# Patient Record
Sex: MALE
[De-identification: names, ages, dates, MRNs, and addresses within clinical notes are randomized per-mention and may not be internally consistent; named-entity substitution may affect disease eponyms.]

---

## 2018-11-30 ENCOUNTER — HOSPITAL ENCOUNTER (OUTPATIENT)
Dept: HOSPITAL 31 - C.SDS | Age: 48
Discharge: HOME | End: 2018-11-30
Attending: UROLOGY
Payer: SELF-PAY

## 2018-11-30 VITALS
SYSTOLIC BLOOD PRESSURE: 113 MMHG | OXYGEN SATURATION: 99 % | TEMPERATURE: 98 F | HEART RATE: 75 BPM | DIASTOLIC BLOOD PRESSURE: 68 MMHG

## 2018-11-30 VITALS — BODY MASS INDEX: 36.9 KG/M2

## 2018-11-30 VITALS — RESPIRATION RATE: 16 BRPM

## 2018-11-30 DIAGNOSIS — N47.1: Primary | ICD-10-CM

## 2018-11-30 DIAGNOSIS — N39.0: ICD-10-CM

## 2018-11-30 PROCEDURE — 54161 CIRCUM 28 DAYS OR OLDER: CPT

## 2018-11-30 PROCEDURE — 88304 TISSUE EXAM BY PATHOLOGIST: CPT

## 2018-11-30 RX ADMIN — BUPIVACAINE HYDROCHLORIDE ONE ML: 2.5 INJECTION, SOLUTION INFILTRATION; PERINEURAL at 14:04

## 2018-11-30 RX ADMIN — BUPIVACAINE HYDROCHLORIDE ONE ML: 2.5 INJECTION, SOLUTION INFILTRATION; PERINEURAL at 14:44

## 2018-11-30 RX ADMIN — BACITRACIN ZINC ONE EA: 500 OINTMENT TOPICAL at 14:44

## 2018-11-30 RX ADMIN — BACITRACIN ZINC ONE EA: 500 OINTMENT TOPICAL at 14:05

## 2018-11-30 NOTE — PCM.SURG1
Surgeon's Initial Post Op Note





- Surgeon's Notes


Surgeon: chula


Assistant: none


Type of Anesthesia: General Endo


Anesthesia Administered By: 


Pre-Operative Diagnosis: phimosis.


Operative Findings: phimosis


Post-Operative Diagnosis: phimosis


Operation Performed: circumcision


Specimen/Specimens Removed: foreskin


Estimated Blood Loss: EBL {In ML}: 5


Blood Products Given: N/A


Drains Used: No Drains


Post-Op Condition: Good


Date of Surgery/Procedure: 11/30/18


Time of Surgery/Procedure: 14:52

## 2019-02-10 ENCOUNTER — HOSPITAL ENCOUNTER (EMERGENCY)
Dept: HOSPITAL 31 - C.ER | Age: 49
Discharge: HOME | End: 2019-02-10
Payer: SELF-PAY

## 2019-02-10 VITALS
RESPIRATION RATE: 20 BRPM | OXYGEN SATURATION: 100 % | DIASTOLIC BLOOD PRESSURE: 79 MMHG | SYSTOLIC BLOOD PRESSURE: 127 MMHG | TEMPERATURE: 98.5 F | HEART RATE: 60 BPM

## 2019-02-10 VITALS — BODY MASS INDEX: 36.9 KG/M2

## 2019-02-10 DIAGNOSIS — R07.9: Primary | ICD-10-CM

## 2019-02-10 LAB
ALBUMIN SERPL-MCNC: 4.7 {NULL, G/DL} (ref 3.5–5)
ALBUMIN/GLOB SERPL: 1.9 {NULL, NULL} (ref 1–2.1)
ALT SERPL-CCNC: 30 {NULL, U/L} (ref 21–72)
AST SERPL-CCNC: 34 {NULL, U/L} (ref 17–59)
BASOPHILS # BLD AUTO: 0.1 {NULL, K/UL} (ref 0–0.2)
BASOPHILS NFR BLD: 0.7 {NULL, %} (ref 0–2)
BUN SERPL-MCNC: 17 {NULL, MG/DL} (ref 9–20)
CALCIUM SERPL-MCNC: 8.9 {NULL, MG/DL} (ref 8.6–10.4)
CK MB SERPL-MCNC: 3.21 {NULL, NG/ML} (ref 0–3.38)
EOSINOPHIL # BLD AUTO: 0.3 {NULL, K/UL} (ref 0–0.7)
EOSINOPHIL NFR BLD: 3.2 {NULL, %} (ref 0–4)
ERYTHROCYTE [DISTWIDTH] IN BLOOD BY AUTOMATED COUNT: 12.7 {NULL, %} (ref 11.5–14.5)
GFR NON-AFRICAN AMERICAN: > 60 {NULL, NULL}
HGB BLD-MCNC: 14.5 {NULL, G/DL} (ref 12–18)
LYMPHOCYTES # BLD AUTO: 3.9 {NULL, K/UL} (ref 1–4.3)
LYMPHOCYTES NFR BLD AUTO: 45.9 {NULL, %} (ref 20–40)
MCH RBC QN AUTO: 29.7 {NULL, PG} (ref 27–31)
MCHC RBC AUTO-ENTMCNC: 34.2 {NULL, G/DL} (ref 33–37)
MCV RBC AUTO: 86.9 {NULL, FL} (ref 80–94)
MONOCYTES # BLD: 0.7 {NULL, K/UL} (ref 0–0.8)
MONOCYTES NFR BLD: 8.6 {NULL, %} (ref 0–10)
NEUTROPHILS # BLD: 3.5 {NULL, K/UL} (ref 1.8–7)
NEUTROPHILS NFR BLD AUTO: 41.6 {NULL, %} (ref 50–75)
NRBC BLD AUTO-RTO: 0.1 {NULL, %} (ref 0–2)
PLATELET # BLD: 197 {NULL, K/UL} (ref 130–400)
PMV BLD AUTO: 8.7 {NULL, FL} (ref 7.2–11.7)
RBC # BLD AUTO: 4.86 {NULL, MIL/UL} (ref 4.4–5.9)
WBC # BLD AUTO: 8.4 {NULL, K/UL} (ref 4.8–10.8)

## 2019-02-10 NOTE — C.PDOC
History Of Present Illness


47 y/o M c no PMHx p/w chest pain x 2 days. State began over 24 hours ago. L 

sided, nonradiating. Denies fever, chills, cough, dyspnea, nausea, vomiting, 

diaphoresis, trauma.


Time Seen by Provider: 02/10/19 18:32


Chief Complaint (Nursing): Chest Pain





Past Medical History


Vital Signs: 





                                Last Vital Signs











Temp  98.5 F   02/10/19 18:09


 


Pulse  60   02/10/19 18:09


 


Resp  20   02/10/19 18:09


 


BP  127/79   02/10/19 18:09


 


Pulse Ox  100   02/10/19 18:09














- Medical History


PMH: Anxiety


   Denies: Chronic Kidney Disease


Family History: States: No Known Family Hx





- Social History


Hx Alcohol Use: No


Hx Substance Use: No





Review Of Systems


Except As Marked, All Systems Reviewed And Found Negative.


Constitutional: Negative for: Fever


Respiratory: Negative for: Shortness of Breath





Physical Exam





- Physical Exam


Additional Physical Exam Comments: 


Constitutional: No acute distress.


Head: Normocephalic. Atraumatic.


Eyes: PERRL.


ENT: Moist mucous membranes.


Neck: Supple.


Cardiovascular: Regular rate. Radial pulse 2+ bilaterally.


Chest: No tenderness.


Respiratory: Clear to auscultation bilaterally.


GI: Soft. Nontender. Nondistended.


Back: No CVA tenderness.


Musculoskeletal: No tenderness or swelling of extremities.


Skin: No rash.


Neurologic: Alert, no focal deficit.














ED Course And Treatment





- Laboratory Results


Result Diagrams: 


                                 02/10/19 19:06





                                 02/10/19 19:06


O2 Sat by Pulse Oximetry: 100





Medical Decision Making


Medical Decision Making: 


EKG Sinus rhythm, 60 bpm, no ST/T wave changes. 





CXR no acute disease.





Discharged home, f/u primary care, return to ED for worsening pain, dyspnea, or 

any other problem.





Disposition





- Disposition


Disposition: HOME/ ROUTINE


Disposition Time: 20:24


Condition: STABLE


Instructions:  Chest Pain, Chest Pain That Is Not Caused by the Heart (DC)


Forms:  [facility] (English)





- Clinical Impression


Clinical Impression: 


 Chest pain

## 2019-02-11 NOTE — RAD
HISTORY:

 chest pain 



COMPARISON:

None available. 



TECHNIQUE:

Chest PA and lateral



FINDINGS:





LUNGS:

No focal consolidation.



Please note that chest x-ray has limited sensitivity for the 

detection of pulmonary masses.



PLEURA:

No significant pleural effusion identified. No definite pneumothorax .



CARDIOVASCULAR:

Heart size appears within normal limits.  No atherosclerotic 

calcification present.



OSSEOUS STRUCTURES:

Degenerative changes.  Mild compression fracture deformities of the 

lower thoracic spine, age indeterminate. 



VISUALIZED UPPER ABDOMEN:

Unremarkable.



OTHER FINDINGS:

None.



IMPRESSION:

No focal consolidation. 



Degenerative changes.  Mild compression fracture deformities of the 

lower thoracic spine, age indeterminate.

## 2019-02-11 NOTE — CARD
--------------- APPROVED REPORT --------------





Date of service: 02/10/2019



EKG Measurement

Heart [id number]

[place] [id number]

[id number]

[id number]

[id number]



<Conclusion>

Sinus bradycardia,u waves

Otherwise normal ECG

## 2019-04-30 ENCOUNTER — HOSPITAL ENCOUNTER (EMERGENCY)
Dept: HOSPITAL 31 - C.ER | Age: 49
Discharge: HOME | End: 2019-04-30
Payer: SELF-PAY

## 2019-04-30 VITALS
TEMPERATURE: 98 F | DIASTOLIC BLOOD PRESSURE: 80 MMHG | HEART RATE: 74 BPM | SYSTOLIC BLOOD PRESSURE: 133 MMHG | OXYGEN SATURATION: 100 %

## 2019-04-30 VITALS — BODY MASS INDEX: 36.9 KG/M2

## 2019-04-30 VITALS — RESPIRATION RATE: 20 BRPM

## 2019-04-30 DIAGNOSIS — R00.2: Primary | ICD-10-CM

## 2019-04-30 DIAGNOSIS — I10: ICD-10-CM

## 2019-04-30 LAB
ALBUMIN SERPL-MCNC: 4.1 G/DL (ref 3.5–5)
ALBUMIN/GLOB SERPL: 1.7 {RATIO} (ref 1–2.1)
ALT SERPL-CCNC: 33 U/L (ref 21–72)
AST SERPL-CCNC: 25 U/L (ref 17–59)
BASOPHILS # BLD AUTO: 0 K/UL (ref 0–0.2)
BASOPHILS NFR BLD: 0.6 % (ref 0–2)
BNP SERPL-MCNC: 19.3 PG/ML (ref 0–450)
BUN SERPL-MCNC: 23 MG/DL (ref 9–20)
CALCIUM SERPL-MCNC: 8.9 MG/DL (ref 8.6–10.4)
EOSINOPHIL # BLD AUTO: 0.3 K/UL (ref 0–0.7)
EOSINOPHIL NFR BLD: 3.7 % (ref 0–4)
ERYTHROCYTE [DISTWIDTH] IN BLOOD BY AUTOMATED COUNT: 12.5 % (ref 11.5–14.5)
GFR NON-AFRICAN AMERICAN: > 60
HGB BLD-MCNC: 13.4 G/DL (ref 12–18)
LYMPHOCYTES # BLD AUTO: 3.7 K/UL (ref 1–4.3)
LYMPHOCYTES NFR BLD AUTO: 46.4 % (ref 20–40)
MCH RBC QN AUTO: 29.6 PG (ref 27–31)
MCHC RBC AUTO-ENTMCNC: 34.5 G/DL (ref 33–37)
MCV RBC AUTO: 85.8 FL (ref 80–94)
MONOCYTES # BLD: 0.7 K/UL (ref 0–0.8)
MONOCYTES NFR BLD: 9.3 % (ref 0–10)
NEUTROPHILS # BLD: 3.2 K/UL (ref 1.8–7)
NEUTROPHILS NFR BLD AUTO: 40 % (ref 50–75)
NRBC BLD AUTO-RTO: 0 % (ref 0–2)
PLATELET # BLD: 190 K/UL (ref 130–400)
PMV BLD AUTO: 7.7 FL (ref 7.2–11.7)
RBC # BLD AUTO: 4.54 MIL/UL (ref 4.4–5.9)
WBC # BLD AUTO: 8 K/UL (ref 4.8–10.8)

## 2019-04-30 NOTE — C.PDOC
History Of Present Illness


49 year old male presents to the ED c/o intermittent palpitations for the past 

few days. Patient denies fever, chills, headache, visual changes, SOB, CP, rash,

weakness, numbness.


Chief Complaint (Nursing): Chest Pain


History Per: Patient


History/Exam Limitations: no limitations


Onset/Duration Of Symptoms: Days, Intermittent Episodes


Current Symptoms Are (Timing): Still Present


Recent travel outside of the [place]: No


Additional History Per: Patient





Past Medical History


Reviewed: Historical Data, Nursing Documentation, Vital Signs


Vital Signs: 





                                Last Vital Signs











Temp  98.0 F   04/30/19 21:10


 


Pulse  74   04/30/19 21:10


 


Resp  18   04/30/19 21:10


 


BP  133/80   04/30/19 21:10


 


Pulse Ox  100   04/30/19 21:10











Primary Care Provider: Jasmyne Jean





- Medical History


PMH: Anxiety, HTN


   Denies: Chronic Kidney Disease


Surgical History: No Surg Hx


Family History: States: Unknown Family Hx





- Social History


Hx Alcohol Use: Yes


Hx Substance Use: No





- Immunization History


Hx Tetanus Toxoid Vaccination: No


Hx Influenza Vaccination: Yes


Hx Pneumococcal Vaccination: No





Review Of Systems


Constitutional: Negative for: Fever, Chills


Eyes: Negative for: Vision Change


Cardiovascular: Positive for: Palpitations.  Negative for: Chest Pain


Respiratory: Negative for: Cough, Shortness of Breath


Gastrointestinal: Negative for: Nausea, Vomiting, Abdominal Pain


Skin: Negative for: Rash


Neurological: Negative for: Weakness, Numbness, Headache, Dizziness





Physical Exam





- Physical Exam


Appears: Non-toxic, No Acute Distress


Skin: Normal Color, Warm, Dry


Head: Atraumatic, Normacephalic


Eye(s): bilateral: Normal Inspection, PERRL, EOMI


Neck: Normal ROM, Supple


Chest: Symmetrical


Cardiovascular: Rhythm Regular


Respiratory: Normal Breath Sounds, No Rales, No Rhonchi, No Wheezing


Gastrointestinal/Abdominal: Soft, No Tenderness, No Guarding, No Rebound


Extremity: Normal ROM, No Tenderness, No Swelling


Neurological/Psych: Oriented x3, Normal Speech, Normal Cognition


Gait: Steady





ED Course And Treatment





- Laboratory Results


Result Diagrams: 


                                 04/30/19 22:01





                                 04/30/19 22:01


O2 Sat by Pulse Oximetry: 100 (ON RA)


Pulse Ox Interpretation: Normal





Medical Decision Making


Medical Decision Making: 


Plan:


* EKG


* Labs


* CXR








Disposition


Counseled Patient/Family Regarding: Diagnosis





- Disposition


Referrals: 


[facility] [Outside]


Disposition: HOME/ ROUTINE


Disposition Time: 22:51


Condition: STABLE


Instructions:  Palpitations (DC)


Forms:  [facility] (English), Gen Discharge Inst Burmese


Print Language: Armenian





- POA


Present On Arrival: None





- Clinical Impression


Clinical Impression: 


 Intermittent palpitations








- Scribe Statement


The provider has reviewed the documentation as recorded by the Scribe


Chidi Lobo





All medical record entries made by the Scribe were at my direction and 

personally dictated by me. I have reviewed the chart and agree that the record 

accurately reflects my personal performance of the history, physical exam, me

dical decision making, and the department course for this patient. I have also 

personally directed, reviewed, and agree with the discharge instructions and 

disposition.

## 2019-05-01 NOTE — RAD
Date of service: 



04/30/2019



HISTORY:

 chest pain 



COMPARISON:

Chest radiographs 02/10/2019.



TECHNIQUE:

Chest PA and lateral views



FINDINGS:



LUNGS:

No active pulmonary disease.



PLEURA:

No significant pleural effusion identified. No pneumothorax apparent.



CARDIOVASCULAR:

No aortic atherosclerotic calcification present.



Normal cardiac size. No pulmonary vascular congestion. 



OSSEOUS STRUCTURES:

No significant abnormalities.



VISUALIZED UPPER ABDOMEN:

Normal.



OTHER FINDINGS:

None.



IMPRESSION:

No interval acute cardiopulmonary disease appreciated.

## 2019-05-01 NOTE — CARD
--------------- APPROVED REPORT --------------





Date of service: 04/30/2019



EKG Measurement

Heart [id number]

[place] [id number]

[id number]

[id number]

[id number]



<Conclusion>

Sinus rhythm with 1st degree AV block

Nonspecific T wave abnormality

Abnormal ECG

## 2021-07-08 ENCOUNTER — EMERGENCY (EMERGENCY)
Facility: HOSPITAL | Age: 51
LOS: 1 days | End: 2021-07-08
Admitting: EMERGENCY MEDICINE
Payer: COMMERCIAL

## 2021-07-08 PROCEDURE — 71045 X-RAY EXAM CHEST 1 VIEW: CPT | Mod: 26

## 2021-07-08 PROCEDURE — 99284 EMERGENCY DEPT VISIT MOD MDM: CPT

## 2022-06-09 ENCOUNTER — EMERGENCY (EMERGENCY)
Facility: HOSPITAL | Age: 52
LOS: 1 days | Discharge: HOME | End: 2022-06-09
Attending: STUDENT IN AN ORGANIZED HEALTH CARE EDUCATION/TRAINING PROGRAM | Admitting: STUDENT IN AN ORGANIZED HEALTH CARE EDUCATION/TRAINING PROGRAM
Payer: COMMERCIAL

## 2022-06-09 VITALS
TEMPERATURE: 98 F | HEART RATE: 70 BPM | SYSTOLIC BLOOD PRESSURE: 130 MMHG | DIASTOLIC BLOOD PRESSURE: 88 MMHG | RESPIRATION RATE: 18 BRPM | OXYGEN SATURATION: 98 %

## 2022-06-09 VITALS
DIASTOLIC BLOOD PRESSURE: 81 MMHG | TEMPERATURE: 97 F | OXYGEN SATURATION: 100 % | SYSTOLIC BLOOD PRESSURE: 151 MMHG | HEIGHT: 69 IN | RESPIRATION RATE: 18 BRPM | WEIGHT: 214.07 LBS | HEART RATE: 53 BPM

## 2022-06-09 DIAGNOSIS — R42 DIZZINESS AND GIDDINESS: ICD-10-CM

## 2022-06-09 DIAGNOSIS — I10 ESSENTIAL (PRIMARY) HYPERTENSION: ICD-10-CM

## 2022-06-09 DIAGNOSIS — R00.1 BRADYCARDIA, UNSPECIFIED: ICD-10-CM

## 2022-06-09 DIAGNOSIS — E78.5 HYPERLIPIDEMIA, UNSPECIFIED: ICD-10-CM

## 2022-06-09 DIAGNOSIS — R11.0 NAUSEA: ICD-10-CM

## 2022-06-09 DIAGNOSIS — Z20.822 CONTACT WITH AND (SUSPECTED) EXPOSURE TO COVID-19: ICD-10-CM

## 2022-06-09 LAB
ALBUMIN SERPL ELPH-MCNC: 4.8 G/DL — SIGNIFICANT CHANGE UP (ref 3.3–5)
ALP SERPL-CCNC: 59 U/L — SIGNIFICANT CHANGE UP (ref 40–120)
ALT FLD-CCNC: 25 U/L — SIGNIFICANT CHANGE UP (ref 10–45)
ANION GAP SERPL CALC-SCNC: 9 MMOL/L — SIGNIFICANT CHANGE UP (ref 5–17)
APTT BLD: 26.9 SEC — LOW (ref 27.5–35.5)
AST SERPL-CCNC: 21 U/L — SIGNIFICANT CHANGE UP (ref 10–40)
BASOPHILS # BLD AUTO: 0.06 K/UL — SIGNIFICANT CHANGE UP (ref 0–0.2)
BASOPHILS NFR BLD AUTO: 0.7 % — SIGNIFICANT CHANGE UP (ref 0–2)
BILIRUB SERPL-MCNC: 0.4 MG/DL — SIGNIFICANT CHANGE UP (ref 0.2–1.2)
BUN SERPL-MCNC: 17 MG/DL — SIGNIFICANT CHANGE UP (ref 7–23)
CALCIUM SERPL-MCNC: 9.2 MG/DL — SIGNIFICANT CHANGE UP (ref 8.4–10.5)
CHLORIDE SERPL-SCNC: 105 MMOL/L — SIGNIFICANT CHANGE UP (ref 96–108)
CK MB CFR SERPL CALC: 3.7 NG/ML — SIGNIFICANT CHANGE UP (ref 0–6.7)
CK SERPL-CCNC: 225 U/L — HIGH (ref 30–200)
CO2 SERPL-SCNC: 26 MMOL/L — SIGNIFICANT CHANGE UP (ref 22–31)
CREAT SERPL-MCNC: 0.95 MG/DL — SIGNIFICANT CHANGE UP (ref 0.5–1.3)
D DIMER BLD IA.RAPID-MCNC: <150 NG/ML DDU — SIGNIFICANT CHANGE UP
EGFR: 97 ML/MIN/1.73M2 — SIGNIFICANT CHANGE UP
EOSINOPHIL # BLD AUTO: 0.16 K/UL — SIGNIFICANT CHANGE UP (ref 0–0.5)
EOSINOPHIL NFR BLD AUTO: 1.9 % — SIGNIFICANT CHANGE UP (ref 0–6)
GLUCOSE SERPL-MCNC: 119 MG/DL — HIGH (ref 70–99)
HCT VFR BLD CALC: 41.5 % — SIGNIFICANT CHANGE UP (ref 39–50)
HGB BLD-MCNC: 14.3 G/DL — SIGNIFICANT CHANGE UP (ref 13–17)
IMM GRANULOCYTES NFR BLD AUTO: 0.4 % — SIGNIFICANT CHANGE UP (ref 0–1.5)
INR BLD: 0.97 — SIGNIFICANT CHANGE UP (ref 0.88–1.16)
LYMPHOCYTES # BLD AUTO: 2.51 K/UL — SIGNIFICANT CHANGE UP (ref 1–3.3)
LYMPHOCYTES # BLD AUTO: 29.3 % — SIGNIFICANT CHANGE UP (ref 13–44)
MCHC RBC-ENTMCNC: 30.2 PG — SIGNIFICANT CHANGE UP (ref 27–34)
MCHC RBC-ENTMCNC: 34.5 GM/DL — SIGNIFICANT CHANGE UP (ref 32–36)
MCV RBC AUTO: 87.7 FL — SIGNIFICANT CHANGE UP (ref 80–100)
MONOCYTES # BLD AUTO: 0.63 K/UL — SIGNIFICANT CHANGE UP (ref 0–0.9)
MONOCYTES NFR BLD AUTO: 7.4 % — SIGNIFICANT CHANGE UP (ref 2–14)
NEUTROPHILS # BLD AUTO: 5.18 K/UL — SIGNIFICANT CHANGE UP (ref 1.8–7.4)
NEUTROPHILS NFR BLD AUTO: 60.3 % — SIGNIFICANT CHANGE UP (ref 43–77)
NRBC # BLD: 0 /100 WBCS — SIGNIFICANT CHANGE UP (ref 0–0)
PLATELET # BLD AUTO: 165 K/UL — SIGNIFICANT CHANGE UP (ref 150–400)
POTASSIUM SERPL-MCNC: 4.3 MMOL/L — SIGNIFICANT CHANGE UP (ref 3.5–5.3)
POTASSIUM SERPL-SCNC: 4.3 MMOL/L — SIGNIFICANT CHANGE UP (ref 3.5–5.3)
PROT SERPL-MCNC: 7.1 G/DL — SIGNIFICANT CHANGE UP (ref 6–8.3)
PROTHROM AB SERPL-ACNC: 11.5 SEC — SIGNIFICANT CHANGE UP (ref 10.5–13.4)
RBC # BLD: 4.73 M/UL — SIGNIFICANT CHANGE UP (ref 4.2–5.8)
RBC # FLD: 11.9 % — SIGNIFICANT CHANGE UP (ref 10.3–14.5)
SARS-COV-2 RNA SPEC QL NAA+PROBE: NEGATIVE — SIGNIFICANT CHANGE UP
SODIUM SERPL-SCNC: 140 MMOL/L — SIGNIFICANT CHANGE UP (ref 135–145)
TROPONIN T SERPL-MCNC: 0.01 NG/ML — SIGNIFICANT CHANGE UP (ref 0–0.01)
WBC # BLD: 8.57 K/UL — SIGNIFICANT CHANGE UP (ref 3.8–10.5)
WBC # FLD AUTO: 8.57 K/UL — SIGNIFICANT CHANGE UP (ref 3.8–10.5)

## 2022-06-09 PROCEDURE — 82962 GLUCOSE BLOOD TEST: CPT

## 2022-06-09 PROCEDURE — 85025 COMPLETE CBC W/AUTO DIFF WBC: CPT

## 2022-06-09 PROCEDURE — 93005 ELECTROCARDIOGRAM TRACING: CPT

## 2022-06-09 PROCEDURE — 84484 ASSAY OF TROPONIN QUANT: CPT

## 2022-06-09 PROCEDURE — 85379 FIBRIN DEGRADATION QUANT: CPT

## 2022-06-09 PROCEDURE — 70496 CT ANGIOGRAPHY HEAD: CPT | Mod: 26,MA

## 2022-06-09 PROCEDURE — 87635 SARS-COV-2 COVID-19 AMP PRB: CPT

## 2022-06-09 PROCEDURE — 99285 EMERGENCY DEPT VISIT HI MDM: CPT

## 2022-06-09 PROCEDURE — 70450 CT HEAD/BRAIN W/O DYE: CPT | Mod: 26,MA,59

## 2022-06-09 PROCEDURE — 99285 EMERGENCY DEPT VISIT HI MDM: CPT | Mod: 25

## 2022-06-09 PROCEDURE — 96374 THER/PROPH/DIAG INJ IV PUSH: CPT | Mod: XU

## 2022-06-09 PROCEDURE — 70498 CT ANGIOGRAPHY NECK: CPT | Mod: MA

## 2022-06-09 PROCEDURE — 70496 CT ANGIOGRAPHY HEAD: CPT | Mod: MA

## 2022-06-09 PROCEDURE — 0042T: CPT

## 2022-06-09 PROCEDURE — 85730 THROMBOPLASTIN TIME PARTIAL: CPT

## 2022-06-09 PROCEDURE — 70450 CT HEAD/BRAIN W/O DYE: CPT | Mod: MA

## 2022-06-09 PROCEDURE — 82553 CREATINE MB FRACTION: CPT

## 2022-06-09 PROCEDURE — 85610 PROTHROMBIN TIME: CPT

## 2022-06-09 PROCEDURE — 70498 CT ANGIOGRAPHY NECK: CPT | Mod: 26,MA

## 2022-06-09 PROCEDURE — 82550 ASSAY OF CK (CPK): CPT

## 2022-06-09 PROCEDURE — 36415 COLL VENOUS BLD VENIPUNCTURE: CPT

## 2022-06-09 PROCEDURE — 80053 COMPREHEN METABOLIC PANEL: CPT

## 2022-06-09 RX ORDER — MECLIZINE HCL 12.5 MG
50 TABLET ORAL ONCE
Refills: 0 | Status: COMPLETED | OUTPATIENT
Start: 2022-06-09 | End: 2022-06-09

## 2022-06-09 RX ORDER — SODIUM CHLORIDE 9 MG/ML
1000 INJECTION, SOLUTION INTRAVENOUS ONCE
Refills: 0 | Status: COMPLETED | OUTPATIENT
Start: 2022-06-09 | End: 2022-06-09

## 2022-06-09 RX ORDER — ONDANSETRON 8 MG/1
4 TABLET, FILM COATED ORAL ONCE
Refills: 0 | Status: COMPLETED | OUTPATIENT
Start: 2022-06-09 | End: 2022-06-09

## 2022-06-09 RX ORDER — MECLIZINE HCL 12.5 MG
1 TABLET ORAL
Qty: 15 | Refills: 0
Start: 2022-06-09

## 2022-06-09 RX ADMIN — Medication 50 MILLIGRAM(S): at 13:03

## 2022-06-09 RX ADMIN — ONDANSETRON 4 MILLIGRAM(S): 8 TABLET, FILM COATED ORAL at 12:40

## 2022-06-09 RX ADMIN — SODIUM CHLORIDE 1000 MILLILITER(S): 9 INJECTION, SOLUTION INTRAVENOUS at 13:30

## 2022-06-09 NOTE — ED ADULT NURSE NOTE - NSSEPSISSUSPECTED_ED_A_ED
Informed Patient  that [facility] received prescription for Skyrizi and benefits investigation is required.  [facility] will be back in touch once insurance determination is received.     06-Oct-2019 20:51 No

## 2022-06-09 NOTE — ED ADULT NURSE NOTE - OBJECTIVE STATEMENT
Pt presents to ED c/o dizziness, nausea, vomiting, chest tightness since 9am. Reports was having back pain and took pain medication from a previous surgery that "might have been ". Pt reports dizziness and nausea present on arrival. Upgraded to Dr. Rashid- stroke code called. portable ccm initiated, brought to CT scan. NO facial droop, no slurred speech, no weakness. Ambulatory w steady gait, but "feels off". A&Ox4, speaking in clear full sentences.  #[id number] used. Pt presents to ED c/o dizziness, nausea, vomiting, chest tightness since 9am. Reports was having back pain and took pain medication from a previous surgery that "might have been ". Pt reports dizziness and nausea present on arrival. Upgraded to Dr. Rashid- stroke code called. portable ccm initiated, brought to CT scan. NO facial droop, no slurred speech, no weakness. Ambulatory w steady gait, but "feels off". A&Ox4, speaking in clear full sentences.

## 2022-06-09 NOTE — ED PROVIDER NOTE - NSFOLLOWUPINSTRUCTIONS_ED_ALL_ED_FT
EnglishSpanish                                                                                                                           Vértigo posicional taylor    Benign Positional Vertigo      El vértigo es la sensación de que usted o todo lo que lo rodea se mueve cuando en realidad eso no sucede. El vértigo posicional taylor es el tipo de vértigo más común. Generalmente se trata de laila enfermedad no dañina (benigna). Esta afección es posicional. [facility] significa que los síntomas son desencadenados por ciertos movimientos y posiciones.    Esta afección puede ser peligrosa si ocurre mientras está haciendo algo que podría suponer un riesgo para usted o para los demás. Incluye actividades parish conducir u operar maquinaria.      ¿Cuáles son las causas?    El oído interno tiene ram llenos de líquido que ayudan a que el cerebro perciba el movimiento y el equilibrio. Cuando el líquido se mueve, el cerebro recibe mensajes sobre la posición del cuerpo.    En el vértigo posicional taylor, los antonette de calcio que están en el oído interno se desprenden y alteran la chalo del oído interno. [facility] hace que el cerebro reciba mensajes confusos sobre la posición del cuerpo.      ¿Qué incrementa el riesgo?    Es más probable que sufra esta afección si:  •Es robin.      •Es mayor de 50 años.      •Ha sufrido laila lesión en la amy recientemente.      •Tiene laila enfermedad en el oído interno.        ¿Cuáles son los signos o síntomas?    Generalmente, los síntomas de rosalba trastorno se presentan al  la amy o los ojos en diferentes direcciones. Los síntomas pueden aparecer repentinamente y suelen durar menos de un minuto. Incluyen los siguientes:  •Pérdida del equilibrio y caídas.      •Sensación de estar dando vueltas o moviéndose.      •Sensación de que el entorno está dando vueltas o moviéndose.      •Náuseas y vómitos.      •Visión borrosa.      •Mareos.      •Movimientos oculares involuntarios (nistagmo).      Los síntomas pueden ser leves y solo causar problemas menores, o pueden ser graves e interferir en la shonna cotidiana. Los episodios de vértigo posicional taylor pueden repetirse (volver a aparecer) con el transcurso del tiempo. Los síntomas también pueden mejorar con el tiempo.      ¿Cómo se diagnostica?    Esta afección se puede diagnosticar en función de lo siguiente:  •Rachana antecedentes médicos.      •Un examen físico de la amy, el santana y los oídos.      •Pruebas de posición para detectar o estimular el vértigo. Es posible que le pidan que gire la amy y cambie de posición, parish pasar de estar sentado a acostarse. El médico estará pendiente por si aparecen síntomas de vértigo.      Juan vez lo deriven a un médico especialista en problemas de la garganta, la nariz y el oído (otorrinolaringólogo), o a dillon que se especializa en trastornos del sistema nervioso (neurólogo).      ¿Cómo se trata?  Medical person standing behind sitting patient using both hands to move patient's head to another position.   Esta afección se puede tratar en laila sesión en la cual el médico le pondrá la amy en posiciones específicas para ayudar a que los antonette desplazados del oído interno se muevan. El tratamiento de esta afección puede llevar varias sesiones. Cuando los casos son graves, juan vez haya que realizar laila cirugía, bright esto no es frecuente.    En algunos casos, el vértigo posicional taylor se resuelve por sí solo en el término de 2 o 4 semanas.      Siga estas instrucciones en vicente casa:    Seguridad     •Muévase lentamente. Evite algunas posiciones o determinados movimientos repentinos de la amy y el cuerpo parish se lo haya indicado el médico.      •Evite conducir y operar maquinaria hasta que el médico le indique que es seguro hacerlo.      •No shawn ninguna tarea que podría ser peligrosa para usted o para otras personas en susan de vértigo.      •Si tiene dificultad para caminar o mantener el equilibrio, use un bastón para mantener la estabilidad. Si se siente mareado o inestable, siéntese de inmediato.      •Retome rachana actividades normales según lo indicado por el médico. Pregúntele al médico qué actividades son seguras para usted.      Instrucciones generales     •Use los medicamentos de venta viola y los recetados solamente parish se lo haya indicado el médico.      •Naya suficiente líquido parish para mantener la orina de color amarillo pálido.      •Concurra a todas las visitas de seguimiento. [facility] es importante.        Comuníquese con un médico si:    •Tiene fiebre.      •Vicente afección empeora o presenta síntomas nuevos.      •Rachana familiares o amigos advierten cambios en vicente comportamiento.      •Tiene náuseas o vómitos que empeoran.      •Tiene adormecimiento o sensación de pinchazos y hormigueo.        Busque ayuda de inmediato si:    •Tiene dificultad para hablar o para moverse.      •Siempre está mareado o se desmaya.      •Presenta kaylie de amy intensos.      •Tiene debilidad en los brazos o las piernas.      •Presenta cambios en la audición o la visión.      •Presenta rigidez en el santana.      •Presenta sensibilidad a la randy.      Estos síntomas pueden representar un problema grave que constituye laila emergencia. No espere a flaquita si los síntomas desaparecen. Solicite atención médica de inmediato. Comuníquese con el servicio de emergencias de vicente localidad (911 en los [place]). No conduzca por rachana propios medios hasta el hospital.       Resumen    •El vértigo es la sensación de que usted o todo lo que lo rodea se mueve cuando en realidad eso no sucede. El vértigo posicional taylor es el tipo de vértigo más común.      •La causa de esta afección es el desplazamiento de los antonette de calcio del oído interno. [facility] produce laila alteración en laila chalo del oído interno que ayuda al cerebro a percibir el movimiento y el equilibrio.      •Los síntomas incluyen pérdida del equilibrio y caídas, sensación de que usted o vicente entorno se mueve, náuseas y vómitos, y visión borrosa.      •Esta afección se puede diagnosticar en función de los síntomas, un examen físico y pruebas de posicionamiento.      •Siga las instrucciones de seguridad que le haya dado el médico y vaya a todas las visitas de seguimiento. [facility] es importante.      Esta información no tiene parish fin reemplazar el consejo del médico. Asegúrese de hacerle al médico cualquier pregunta que tenga.      Document Revised: 12/13/2021 Document Reviewed: 12/13/2021    Elsevier Patient Education © 2022 Elsevier Inc.

## 2022-06-09 NOTE — CONSULT NOTE ADULT - NS PANP COMMENT GEN_ALL_CORE FT
The patient is a 52-year-old Burundian speaking gentleman with a history of hypertension and hyperlipidemia who presented to the emergency department with vertigo associated with nausea, vomited x1.  NIHSS was 0.  There was no significant gait instability.  Exam was consistent with peripheral vertigo.  Head CT was unremarkable.  Recommend treatment for peripheral vertigo.  Dispo per ED.

## 2022-06-09 NOTE — CONSULT NOTE ADULT - SUBJECTIVE AND OBJECTIVE BOX
**STROKE CODE CONSULT NOTE**    Last known well time/Time of onset of symptoms: 9am on 6/9    HPI: 52y Male with PMHx of HTN, HLD presented to ED today for dizziness. Stroke code called. Patient is primarily Mongolian speaking.  #[id number]. Explains he was driving and at 9am started to experience dizziness and lightheadedness. Had one episode of nausea and vomiting. Had another episode of vomiting after CTA. Denied the room spinning. Denies being on anticoagulants. Denies focal numbness, weakness, vision changes, speech changes, or gait abnormalities.     BP in /86. . CTH unremarkable.     T(C): 36.3 (06-09-22 @ 12:00), Max: 36.3 (06-09-22 @ 12:00)  HR: 66 (06-09-22 @ 14:07) (53 - 81)  BP: 158/79 (06-09-22 @ 14:07) (126/72 - 176/86)  RR: 18 (06-09-22 @ 14:07) (18 - 18)  SpO2: 98% (06-09-22 @ 14:07) (98% - 100%)    PAST MEDICAL & SURGICAL HISTORY:      FAMILY HISTORY:      SOCIAL HISTORY:   Smoking status: denies  Drinking: occasional  Drug Use: denies    ROS:   see HPI      MEDICATIONS  (STANDING):    MEDICATIONS  (PRN):    Allergies    No Known Allergies    Intolerances      Vital Signs Last 24 Hrs  T(C): 36.3 (09 Jun 2022 12:00), Max: 36.3 (09 Jun 2022 12:00)  T(F): 97.3 (09 Jun 2022 12:00), Max: 97.3 (09 Jun 2022 12:00)  HR: 66 (09 Jun 2022 14:07) (53 - 81)  BP: 158/79 (09 Jun 2022 14:07) (126/72 - 176/86)  BP(mean): --  RR: 18 (09 Jun 2022 14:07) (18 - 18)  SpO2: 98% (09 Jun 2022 14:07) (98% - 100%)    Physical exam:  Neurologic:  -Mental status: Awake, alert, oriented to person, place, and time. Speech is fluent with intact naming, repetition, and comprehension, no dysarthria. Recent and remote memory intact. Follows commands. Attention/concentration intact.   -Cranial nerves:   II: Visual fields are full to confrontation.  III, IV, VI: Horizontal nystagmus on initial exam which improved with re-examination about an hour ago. Pupils equally round and reactive to light  V:  Facial sensation V1-V3 equal and intact   VII: Face is symmetric with normal eye closure and smile  VIII: Hearing is bilaterally intact to voice  XII: Tongue protrudes midline  Motor: Normal bulk and tone. No pronator drift. Strength bilateral upper extremity 5/5, bilateral lower extremities 5/5.  Sensation: Intact to light touch bilaterally. No neglect or extinction on double simultaneous testing.  Coordination: No dysmetria on finger-to-nose bilaterally  Gait: Narrow gait and steady    NIHSS: 0    Fingerstick Blood Glucose: CAPILLARY BLOOD GLUCOSE  123 (09 Jun 2022 13:17)      POCT Blood Glucose.: 123 mg/dL (09 Jun 2022 12:34)    LABS:                        14.3   8.57  )-----------( 165      ( 09 Jun 2022 12:32 )             41.5     06-09    140  |  105  |  17  ----------------------------<  119<H>  4.3   |  26  |  0.95    Ca    9.2      09 Jun 2022 12:32    TPro  7.1  /  Alb  4.8  /  TBili  0.4  /  DBili  x   /  AST  21  /  ALT  25  /  AlkPhos  59  06-09    PT/INR - ( 09 Jun 2022 12:32 )   PT: 11.5 sec;   INR: 0.97          PTT - ( 09 Jun 2022 12:32 )  PTT:26.9 sec  CARDIAC MARKERS ( 09 Jun 2022 12:32 )  x     / 0.01 ng/mL / 225 U/L / x     / 3.7 ng/mL      RADIOLOGY & ADDITIONAL STUDIES:  CTH:  No acute intracranial hemorrhage, mass effect or demarcated territorial   infarction.  CTA:  Intracranial CTA: No large vessel occlusion or high-grade stenosis. 2 mm   inferiorly directed outpouchings of the supraclinoid bilateral ICA which   may represent infundibula versus small aneurysms.    Extracranial CTA: No significant stenosis, occlusion or dissection of the   cervical carotid or vertebral arteries.    CTP:  No reported abnormal perfusion metrics    -----------------------------------------------------------------------------------------------------------------  IV-tPA (Y/N):  no, no disabling sx

## 2022-06-09 NOTE — ED ADULT TRIAGE NOTE - CHIEF COMPLAINT QUOTE
Patient /co of dizziness, headache, nausea, no vomitting, intermittent blurring of vision, mild chest pain, no SOB, symptoms started at 9 am this morning.  States has Hx of HTN and taking meds for BP.  No facial asymmetry, slurred speech, arm drift, no unsteady gait.  EKG in progress.  Patient Telugu speaking, son interpreting. Patient /co of dizziness, headache, nausea, no vomitting, intermittent blurring of vision, mild chest pain, no SOB, symptoms started at 9 am this morning.  States has Hx of HTN and taking meds for BP.  No facial asymmetry, slurred speech, arm drift, no unsteady gait.  EKG in progress.  Patient Italian speaking, son interpreting.  Upgrade to Dr. Arambula.

## 2022-06-09 NOTE — ED PROVIDER NOTE - PROGRESS NOTE DETAILS
Imaging and labs Imaging and labs negative for acute pathology. Symptoms resolved with meclizine. Cleared by neuro team. D/c home on meclizine prn and with outpatient neuro follow up.

## 2022-06-09 NOTE — ED ADULT NURSE NOTE - CHIEF COMPLAINT QUOTE
Patient /co of dizziness, headache, nausea, no vomitting, intermittent blurring of vision, mild chest pain, no SOB, symptoms started at 9 am this morning.  States has Hx of HTN and taking meds for BP.  No facial asymmetry, slurred speech, arm drift, no unsteady gait.  EKG in progress.  Patient Croatian speaking, son interpreting.  Upgrade to Dr. Arambula.

## 2022-06-09 NOTE — ED PROVIDER NOTE - CLINICAL SUMMARY MEDICAL DECISION MAKING FREE TEXT BOX
51 yo male with a hx fo HTN and HLD p/w dizziness and nausea that started at around 9AM today. Mild bradycardia and hypertension. Exam shows horizontal nystagmus. No neuro deficits otherwise. [place]-hallpike negative. Work up for central vs peripheral cause of vertigo. Sinus tereso on EKG. ?Symptomatic bradycardia. No clinical hypoperfusion- hold off pacing. Stroke code activated.

## 2022-06-09 NOTE — ED ADULT NURSE REASSESSMENT NOTE - NS ED NURSE REASSESS COMMENT FT1
pt verbalized feels much better at this time. denies dizziness, nausea. Ambulatory with steady gait to bathroom accompanied by RN.

## 2022-06-09 NOTE — ED ADULT NURSE REASSESSMENT NOTE - NS ED NURSE REASSESS COMMENT FT1
pt had episode of vomiting s/p CTA. Primary RN, Dr. Rashid and stroke team at bedside. Medicated as per MAR.

## 2022-06-09 NOTE — CONSULT NOTE ADULT - ASSESSMENT
52y Male with PMHx of HTN, HLD presented to ED today for dizziness. Stroke code called. NIHSS 0. CTH unremarkable. CTP neg. CTA shows no large vessel occlusion or high-grade stenosis. 2 mm inferiorly directed outpouchings of the supraclinoid bilateral ICA which may represent infundibula versus small aneurysms. No significant stenosis, occlusion or dissection of the cervical carotid or vertebral arteries. No tpa given as patient did not have disabling sx. Patient's sx dramatically improved with meclizine. Etiology of sx unlikely stroke. No further inpatient stroke w/u indicated at this time. Patient to return to ED if he starts to experience new onset weakness, numbness, vision or speech changes, gait abnormalities, or any other worsening sx.    Case discussed with Neurology attending Dr. Arabella Asencio

## 2022-06-09 NOTE — ED PROVIDER NOTE - OBJECTIVE STATEMENT
51 yo male with a hx fo HTN and HLD p/w dizziness and nausea that started at around 9AM today. Never had this before. Denies hx of vertigo. No falls or trauma. Reports one episode of chest "burning pain" earlier today that resolved. No shortness of breath or back pain. Denies prior strokes. Not on blood thinners. Denies changes in vision. Reports feeling gait instability due tot the dizziness. Describes dizziness as lightheadedness. Denies room spinning sensation. no facial droop, weakness, numbness, tingling, speech problem, dysphagia, abdominal pain, diarrhea, constipation, syncope, palpitations. He was driving in the morning when his symptoms started. Dizziness worse with changes in position. +Smoking. No drugs. Occasional EtOH.

## 2022-06-09 NOTE — ED PROVIDER NOTE - CARE PROVIDER_API CALL
Arabella Asencio)  Neurology  [address] [address]  Phone: [phone]  Fax: [phone]  Follow Up Time:

## 2022-06-09 NOTE — ED PROVIDER NOTE - PHYSICAL EXAMINATION
VITAL SIGNS: I have reviewed nursing notes and confirm.  CONSTITUTIONAL: Well appearing, in no acute distress.   SKIN:  warm and dry, no acute rash.   HEAD:  normocephalic, atraumatic.  EYES: EOM intact; conjunctiva and sclera clear.  ENT: No nasal discharge; airway clear.   NECK: Supple; non tender.  CARD: S1, S2 normal; no murmurs, gallops, or rubs. Regular rate and rhythm.   RESP:  Clear to auscultation b/l, no wheezes, rales or rhonchi.  ABD: Normal bowel sounds; soft; non-distended; non-tender; no guarding/ rebound.  EXT: Normal ROM. No clubbing, cyanosis or edema. 2+ pulses to b/l ue/le.  NEURO: Alert, oriented, +Bidirectional horizontal nystagmus. Strength 5/5 in all extremities, no sensory deficits, normal gait and coordination. monroe-hallpike negative.   PSYCH: Cooperative, mood and affect appropriate.

## 2022-06-09 NOTE — ED ADULT NURSE REASSESSMENT NOTE - NS ED NURSE REASSESS COMMENT FT1
stroke attending declared pt not a TPA candidate. Medicated as per MAR. Continuous cardiac/pulse oximetry monitoring remains in place. Pt awake and alert, speaking in clear full sentences. Bipolar disease, chronic    COPD (chronic obstructive pulmonary disease)    Depression    Emphysema with chronic bronchitis    History of ETOH abuse

## 2024-02-14 NOTE — ED PROVIDER NOTE - PATIENT PORTAL LINK FT
Unknown You can access the FollowMyHealth Patient Portal offered by [facility] by registering at the following website: [email or web address]. By joining [facility]’s FollowMyHealth portal, you will also be able to view your health information using other applications (apps) compatible with our system.